# Patient Record
Sex: MALE | Race: BLACK OR AFRICAN AMERICAN | NOT HISPANIC OR LATINO | URBAN - METROPOLITAN AREA
[De-identification: names, ages, dates, MRNs, and addresses within clinical notes are randomized per-mention and may not be internally consistent; named-entity substitution may affect disease eponyms.]

---

## 2021-06-16 ENCOUNTER — EMERGENCY (EMERGENCY)
Facility: HOSPITAL | Age: 41
LOS: 0 days | Discharge: HOME | End: 2021-06-17
Attending: EMERGENCY MEDICINE | Admitting: EMERGENCY MEDICINE
Payer: COMMERCIAL

## 2021-06-16 VITALS
HEIGHT: 64 IN | SYSTOLIC BLOOD PRESSURE: 156 MMHG | TEMPERATURE: 100 F | OXYGEN SATURATION: 99 % | WEIGHT: 179.9 LBS | HEART RATE: 82 BPM | RESPIRATION RATE: 20 BRPM | DIASTOLIC BLOOD PRESSURE: 89 MMHG

## 2021-06-16 DIAGNOSIS — R30.0 DYSURIA: ICD-10-CM

## 2021-06-16 DIAGNOSIS — N13.2 HYDRONEPHROSIS WITH RENAL AND URETERAL CALCULOUS OBSTRUCTION: ICD-10-CM

## 2021-06-16 DIAGNOSIS — R11.0 NAUSEA: ICD-10-CM

## 2021-06-16 DIAGNOSIS — R10.30 LOWER ABDOMINAL PAIN, UNSPECIFIED: ICD-10-CM

## 2021-06-16 PROCEDURE — 99285 EMERGENCY DEPT VISIT HI MDM: CPT

## 2021-06-16 NOTE — ED ADULT TRIAGE NOTE - CHIEF COMPLAINT QUOTE
It started around 5 o'clock I got pain here (right upper and lower quadrant), the pain was in the back, now its going down and around, I feel nauseous and I have the cold sweats - patient

## 2021-06-17 VITALS
DIASTOLIC BLOOD PRESSURE: 81 MMHG | RESPIRATION RATE: 20 BRPM | TEMPERATURE: 99 F | HEART RATE: 81 BPM | OXYGEN SATURATION: 97 % | SYSTOLIC BLOOD PRESSURE: 149 MMHG

## 2021-06-17 LAB
ALBUMIN SERPL ELPH-MCNC: 4.9 G/DL — SIGNIFICANT CHANGE UP (ref 3.5–5.2)
ALP SERPL-CCNC: 77 U/L — SIGNIFICANT CHANGE UP (ref 30–115)
ALT FLD-CCNC: 45 U/L — HIGH (ref 0–41)
ANION GAP SERPL CALC-SCNC: 12 MMOL/L — SIGNIFICANT CHANGE UP (ref 7–14)
APPEARANCE UR: CLEAR — SIGNIFICANT CHANGE UP
AST SERPL-CCNC: 40 U/L — SIGNIFICANT CHANGE UP (ref 0–41)
BACTERIA # UR AUTO: NEGATIVE — SIGNIFICANT CHANGE UP
BASOPHILS # BLD AUTO: 0.01 K/UL — SIGNIFICANT CHANGE UP (ref 0–0.2)
BASOPHILS NFR BLD AUTO: 0.1 % — SIGNIFICANT CHANGE UP (ref 0–1)
BILIRUB DIRECT SERPL-MCNC: <0.2 MG/DL — SIGNIFICANT CHANGE UP (ref 0–0.2)
BILIRUB INDIRECT FLD-MCNC: >0.1 MG/DL — LOW (ref 0.2–1.2)
BILIRUB SERPL-MCNC: 0.3 MG/DL — SIGNIFICANT CHANGE UP (ref 0.2–1.2)
BILIRUB UR-MCNC: NEGATIVE — SIGNIFICANT CHANGE UP
BUN SERPL-MCNC: 21 MG/DL — HIGH (ref 10–20)
CALCIUM SERPL-MCNC: 9.8 MG/DL — SIGNIFICANT CHANGE UP (ref 8.5–10.1)
CHLORIDE SERPL-SCNC: 103 MMOL/L — SIGNIFICANT CHANGE UP (ref 98–110)
CO2 SERPL-SCNC: 23 MMOL/L — SIGNIFICANT CHANGE UP (ref 17–32)
COLOR SPEC: SIGNIFICANT CHANGE UP
CREAT SERPL-MCNC: 1.5 MG/DL — SIGNIFICANT CHANGE UP (ref 0.7–1.5)
DIFF PNL FLD: ABNORMAL
EOSINOPHIL # BLD AUTO: 0 K/UL — SIGNIFICANT CHANGE UP (ref 0–0.7)
EOSINOPHIL NFR BLD AUTO: 0 % — SIGNIFICANT CHANGE UP (ref 0–8)
EPI CELLS # UR: 0 /HPF — SIGNIFICANT CHANGE UP (ref 0–5)
GLUCOSE SERPL-MCNC: 136 MG/DL — HIGH (ref 70–99)
GLUCOSE UR QL: NEGATIVE — SIGNIFICANT CHANGE UP
HCT VFR BLD CALC: 42.8 % — SIGNIFICANT CHANGE UP (ref 42–52)
HGB BLD-MCNC: 14 G/DL — SIGNIFICANT CHANGE UP (ref 14–18)
HYALINE CASTS # UR AUTO: 1 /LPF — SIGNIFICANT CHANGE UP (ref 0–7)
IMM GRANULOCYTES NFR BLD AUTO: 0.3 % — SIGNIFICANT CHANGE UP (ref 0.1–0.3)
KETONES UR-MCNC: ABNORMAL
LACTATE SERPL-SCNC: 0.9 MMOL/L — SIGNIFICANT CHANGE UP (ref 0.7–2)
LEUKOCYTE ESTERASE UR-ACNC: NEGATIVE — SIGNIFICANT CHANGE UP
LIDOCAIN IGE QN: 13 U/L — SIGNIFICANT CHANGE UP (ref 7–60)
LYMPHOCYTES # BLD AUTO: 0.71 K/UL — LOW (ref 1.2–3.4)
LYMPHOCYTES # BLD AUTO: 7.6 % — LOW (ref 20.5–51.1)
MCHC RBC-ENTMCNC: 26.9 PG — LOW (ref 27–31)
MCHC RBC-ENTMCNC: 32.7 G/DL — SIGNIFICANT CHANGE UP (ref 32–37)
MCV RBC AUTO: 82.1 FL — SIGNIFICANT CHANGE UP (ref 80–94)
MONOCYTES # BLD AUTO: 0.51 K/UL — SIGNIFICANT CHANGE UP (ref 0.1–0.6)
MONOCYTES NFR BLD AUTO: 5.5 % — SIGNIFICANT CHANGE UP (ref 1.7–9.3)
NEUTROPHILS # BLD AUTO: 8.06 K/UL — HIGH (ref 1.4–6.5)
NEUTROPHILS NFR BLD AUTO: 86.5 % — HIGH (ref 42.2–75.2)
NITRITE UR-MCNC: NEGATIVE — SIGNIFICANT CHANGE UP
NRBC # BLD: 0 /100 WBCS — SIGNIFICANT CHANGE UP (ref 0–0)
PH UR: 6 — SIGNIFICANT CHANGE UP (ref 5–8)
PLATELET # BLD AUTO: 208 K/UL — SIGNIFICANT CHANGE UP (ref 130–400)
POTASSIUM SERPL-MCNC: 5.1 MMOL/L — HIGH (ref 3.5–5)
POTASSIUM SERPL-SCNC: 5.1 MMOL/L — HIGH (ref 3.5–5)
PROT SERPL-MCNC: 7.7 G/DL — SIGNIFICANT CHANGE UP (ref 6–8)
PROT UR-MCNC: ABNORMAL
RBC # BLD: 5.21 M/UL — SIGNIFICANT CHANGE UP (ref 4.7–6.1)
RBC # FLD: 13.5 % — SIGNIFICANT CHANGE UP (ref 11.5–14.5)
RBC CASTS # UR COMP ASSIST: 1 /HPF — SIGNIFICANT CHANGE UP (ref 0–4)
SODIUM SERPL-SCNC: 138 MMOL/L — SIGNIFICANT CHANGE UP (ref 135–146)
SP GR SPEC: 1.03 — SIGNIFICANT CHANGE UP (ref 1.01–1.03)
UROBILINOGEN FLD QL: SIGNIFICANT CHANGE UP
WBC # BLD: 9.32 K/UL — SIGNIFICANT CHANGE UP (ref 4.8–10.8)
WBC # FLD AUTO: 9.32 K/UL — SIGNIFICANT CHANGE UP (ref 4.8–10.8)
WBC UR QL: 1 /HPF — SIGNIFICANT CHANGE UP (ref 0–5)

## 2021-06-17 PROCEDURE — 74177 CT ABD & PELVIS W/CONTRAST: CPT | Mod: 26,MA

## 2021-06-17 PROCEDURE — 99282 EMERGENCY DEPT VISIT SF MDM: CPT

## 2021-06-17 PROCEDURE — 76705 ECHO EXAM OF ABDOMEN: CPT | Mod: 26

## 2021-06-17 RX ORDER — KETOROLAC TROMETHAMINE 30 MG/ML
30 SYRINGE (ML) INJECTION ONCE
Refills: 0 | Status: DISCONTINUED | OUTPATIENT
Start: 2021-06-17 | End: 2021-06-17

## 2021-06-17 RX ORDER — TAMSULOSIN HYDROCHLORIDE 0.4 MG/1
1 CAPSULE ORAL
Qty: 5 | Refills: 0
Start: 2021-06-17 | End: 2021-06-21

## 2021-06-17 RX ORDER — ONDANSETRON 8 MG/1
4 TABLET, FILM COATED ORAL ONCE
Refills: 0 | Status: COMPLETED | OUTPATIENT
Start: 2021-06-17 | End: 2021-06-17

## 2021-06-17 RX ORDER — CEFPODOXIME PROXETIL 100 MG
1 TABLET ORAL
Qty: 20 | Refills: 0
Start: 2021-06-17 | End: 2021-06-26

## 2021-06-17 RX ORDER — SODIUM CHLORIDE 9 MG/ML
1000 INJECTION, SOLUTION INTRAVENOUS ONCE
Refills: 0 | Status: COMPLETED | OUTPATIENT
Start: 2021-06-17 | End: 2021-06-17

## 2021-06-17 RX ORDER — KETOROLAC TROMETHAMINE 30 MG/ML
1 SYRINGE (ML) INJECTION
Qty: 20 | Refills: 0
Start: 2021-06-17 | End: 2021-06-21

## 2021-06-17 RX ORDER — MORPHINE SULFATE 50 MG/1
4 CAPSULE, EXTENDED RELEASE ORAL ONCE
Refills: 0 | Status: DISCONTINUED | OUTPATIENT
Start: 2021-06-17 | End: 2021-06-17

## 2021-06-17 RX ORDER — CEFTRIAXONE 500 MG/1
1000 INJECTION, POWDER, FOR SOLUTION INTRAMUSCULAR; INTRAVENOUS ONCE
Refills: 0 | Status: COMPLETED | OUTPATIENT
Start: 2021-06-17 | End: 2021-06-17

## 2021-06-17 RX ADMIN — CEFTRIAXONE 100 MILLIGRAM(S): 500 INJECTION, POWDER, FOR SOLUTION INTRAMUSCULAR; INTRAVENOUS at 04:14

## 2021-06-17 RX ADMIN — Medication 30 MILLIGRAM(S): at 04:13

## 2021-06-17 RX ADMIN — ONDANSETRON 4 MILLIGRAM(S): 8 TABLET, FILM COATED ORAL at 02:08

## 2021-06-17 RX ADMIN — SODIUM CHLORIDE 1000 MILLILITER(S): 9 INJECTION, SOLUTION INTRAVENOUS at 03:05

## 2021-06-17 RX ADMIN — MORPHINE SULFATE 4 MILLIGRAM(S): 50 CAPSULE, EXTENDED RELEASE ORAL at 03:08

## 2021-06-17 RX ADMIN — MORPHINE SULFATE 4 MILLIGRAM(S): 50 CAPSULE, EXTENDED RELEASE ORAL at 02:02

## 2021-06-17 RX ADMIN — Medication 30 MILLIGRAM(S): at 04:14

## 2021-06-17 RX ADMIN — SODIUM CHLORIDE 1000 MILLILITER(S): 9 INJECTION, SOLUTION INTRAVENOUS at 04:14

## 2021-06-17 NOTE — ED PROVIDER NOTE - PHYSICAL EXAMINATION
Physical Exam    Vital Signs: I have reviewed the initial vital signs.  Constitutional: well-nourished, appears stated age, no acute distress  Eyes: Conjunctiva pink, Sclera clear   Cardiovascular: S1 and S2, regular rate, regular rhythm, well-perfused extremities, radial pulses equal and 2+ b/l.   Respiratory: unlabored respiratory effort, clear to auscultation bilaterally no wheezing, rales and rhonchi. pt is speaking full sentences. no accessory muscle use.   Gastrointestinal: soft, (+) epigastric and ruq tenderness, nondistended abdomen, no pulsatile mass, normal bowl sounds, no rebound, no guarding, negative psoas, negative obturator, negative murphys. no cva tenderness.  Musculoskeletal: supple neck, no lower extremity edema, no calf tenderness, no midline tenderness, no palpable spinal step offs  Integumentary: warm, dry, no rash  Neurologic: awake, alert  Psychiatric: appropriate mood, appropriate affect

## 2021-06-17 NOTE — ED PROVIDER NOTE - CARE PROVIDER_API CALL
Rajendra Escobar)  Urology  [address] [place] [address]  Phone: [phone]  Fax: [phone]  Follow Up Time: 1-3 Days

## 2021-06-17 NOTE — ED PROVIDER NOTE - OBJECTIVE STATEMENT
39 y/o male with no significant PMH presents to the ED for evaluation of abdominal pain that begin in his lower abdomen and now has localized to the upper abdomen that began around 5pm after work associated with nausea. pt reports mild burning wit urination earlier today that has subsided. pt denies fever, chills, cough, hx of hernia, hx of abdominal surgeries, hx of endoscopy or colonoscopy, vomiting, diarrhea, constipation, blood in the urine or stool, rashes, recent trauma, weakness, chest pain, sob, back pain or hx of kidney stones,

## 2021-06-17 NOTE — CONSULT NOTE ADULT - SUBJECTIVE AND OBJECTIVE BOX
Urology Consult Note: 39 y/o male with no pmh presents to the ED c/o right sided flank pain for 1 day. Patient states that his pain began yesterday around 5pm and was localized to the right upper and and right lower quadrants. Patient states that as the evening progressed, the pain radiated to the back and down to the lower abdomen and was associated with nausea without vomiting. CT A/P revealed mild right hydroureteronephrosis extending to a 4 mm obstructing ureteral stone at the level of the ureterovesicular junction resulting delayed right nephrogram and significant periureteral/perinephric fat stranding. Indeterminant 8 mm left interpolar hypodense lesion. Consider nonemergent outpatient renal ultrasound for further evaluation.  was consulted for further evaluation of hydronephrosis and kidney stone. Patient states that he has never had a kidney stone in the past and has never seen a . Patient states that upon admission to the ED his pain was a 10/10. In ED, patient received Toradol, Morphine, Fluids and abx - patient notes pain is controlled at a 3/10 and feels much better at this time. Patient admits to mild RUQ pain, R flank pain that radiates to the lower abdomen and nausea that has since resolved. Patient denies fevers, chills, vomiting, chest pain, SOB, dysuria, hematuria, and other LUTS.      [ x ] A 10 Point Review of Systems was negative except where noted    Allergies: No Known Allergies    SOCIAL HISTORY: No illicit drug use    Vital Signs Last 24 Hrs  T(C): 37.3 (2021 05:15), Max: 37.7 (2021 23:28)  T(F): 99.2 (2021 05:15), Max: 99.8 (2021 23:28)  HR: 81 (2021 05:15) (81 - 82)  BP: 149/81 (2021 05:15) (149/81 - 156/89)  RR: 20 (2021 05:15) (20 - 20)  SpO2: 97% (2021 05:15) (97% - 99%)    PHYSICAL EXAM:  Constitutional: NAD, well-developed, well nourished. comfortably laying in bed  HEENT: NC/AT  Back: No left CVA tenderness, + mild right CVA tenderness  Respiratory: No accessory respiratory muscle use  Abd: Soft, distended, mild ttp to RUQ, epigastric area  Cardio: +S1/S2  : no suprapubic tenderness to palpation  Extremities: No edema or cyanosis, SIMPSON x 4  Neurological: Awake and alert  Psychiatric: Normal mood, normal affect  Skin: Good color, non diaphoretic    LABS:                      14.0   9.32  )-----------( 208      ( 2021 01:54 )             42.8         138  |  103  |  21<H>  ----------------------------<  136<H>  5.1<H>   |  23  |  1.5    Ca    9.8      2021 01:54    TPro  7.7  /  Alb  4.9  /  TBili  0.3  /  DBili  <0.2  /  AST  40  /  ALT  45<H>  /  AlkPhos  77        Urinalysis Basic - ( 2021 01:56 )    Color: Light Yellow / Appearance: Clear / S.026 / pH: x  Gluc: x / Ketone: Small  / Bili: Negative / Urobili: <2 mg/dL   Blood: x / Protein: 30 mg/dL / Nitrite: Negative   Leuk Esterase: Negative / RBC: 1 /HPF / WBC 1 /HPF   Sq Epi: x / Non Sq Epi: 0 /HPF / Bacteria: Negative    RADIOLOGY & ADDITIONAL STUDIES:  < from: US Abdomen Upper Quadrant Right (21 @ 02:24) >    EXAM:  US ABDOMEN RT UPR QUADRANT            PROCEDURE DATE:  2021            INTERPRETATION:  CLINICAL INFORMATION: Right upper quadrant/epigastric pain.    COMPARISON: None available.    TECHNIQUE: Sonography of the right upper quadrant.    FINDINGS:  Liver: Increased echogenicity.  Bile ducts: Normal caliber. Common bile duct measures 3 mm.  Gallbladder: No cholelithiasis visualized. Indeterminate sonographic Lang's sign.  Pancreas: Visualized portions are within normal limits.  Right kidney: 11.3 cm. No hydronephrosis.  Ascites: None.  IVC: Visualized portions are within normal limits.    IMPRESSION:  1.  No definite sonographic evidence of acute cholecystitis.  2.  Hepatic steatosis.            ERNIE MONTANA M.D., RESIDENT RADIOLOGIST  This document has been electronically signed.  ROWAN BURNS MD; Attending Radiologist  This document has been electronically signed. 2021  3:23AM    < end of copied text >  < from: CT Abdomen and Pelvis w/ IV Cont (21 @ 02:50) >    EXAM:  CT ABDOMEN AND PELVIS IC            PROCEDURE DATE:  2021            INTERPRETATION:  CLINICAL STATEMENT: Right upper quadrant pain.    TECHNIQUE: Contiguous axial CT images were obtained from the lower chest to the pubic symphysis following administration of 100cc Optiray 320 intravenous contrast.  Oral contrast was not administered.  Reformatted images in the coronal and sagittal planes were acquired.    COMPARISON CT: None.    OTHER STUDIES USED FOR CORRELATION: None.      FINDINGS:    LOWER CHEST: Bilateral subsegmental atelectasis.    HEPATOBILIARY: Unremarkable.    SPLEEN: Unremarkable.    PANCREAS: Unremarkable.    ADRENAL GLANDS: Unremarkable.    KIDNEYS: Mild right hydroureteronephrosis extending to a 3 x 4 x 2 mm obstructing ureteral stone at the level of the ureterovesicular junction (HU= [id number]; series 5, image 408). Resulting delayed right-sided nephrogram and periureteral/perinephric fat stranding. Indeterminate 8 mm left interpolar hypodensity, possibly reflecting hemorrhagic cyst.    ABDOMINOPELVIC NODES: Unremarkable.    PELVIC ORGANS: Unremarkable.    PERITONEUM/MESENTERY/BOWEL: No evidence of bowel obstruction, pneumoperitoneum or ascites. Appendix is unremarkable.    BONES/SOFT TISSUES: No acute osseousabnormality.      IMPRESSION:  1.  Mild right hydroureteronephrosis extending to a 4 mm obstructing ureteral stone at the level of the ureterovesicular junction. Resulting delayed right nephrogram and significant periureteral/perinephric fat stranding.  2.  Indeterminant 8 mm left interpolar hypodense lesion. Consider nonemergent outpatient renal ultrasound for further evaluation.            ERNIE MONTANA M.D., RESIDENT RADIOLOGIST  This document has been electronically signed.  ROWAN BURNS MD;Attending Radiologist  This document has been electronically signed. 2021  3:41AM    < end of copied text >

## 2021-06-17 NOTE — ED PROVIDER NOTE - PROGRESS NOTE DETAILS
Pt feels much better. Cleared by urology for DC. Will DC w cefpodoxime and urology f/u. Full DC instructions discussed and patient knows when to seek immediate medical attention. Patient has proper follow-up. All results discussed with the patient they may require further work-up. Limitations of ED work-up discussed. All  questions and concerns from patient or family addressed. Understanding of insturctions verbalized

## 2021-06-17 NOTE — ED ADULT NURSE NOTE - OBJECTIVE STATEMENT
Pt presents to ED c/o R flank pain and N/V. Pt denies fever or dysuria. Pt is awake alert and not in any distress

## 2021-06-17 NOTE — ED PROVIDER NOTE - PATIENT PORTAL LINK FT
You can access the FollowMyHealth Patient Portal offered by [facility] by registering at the following website: [email or web address]. By joining [facility]’s FollowMyHealth portal, you will also be able to view your health information using other applications (apps) compatible with our system.

## 2021-06-17 NOTE — CONSULT NOTE ADULT - ASSESSMENT
39 y/o male c/o right sided flank pain for 1 day - CT A/P revealed mild right hydroureteronephrosis extending to a 4 mm obstructing ureteral stone at the level of the ureterovesicular junction resulting delayed right nephrogram and significant periureteral/perinephric fat stranding. Indeterminant 8 mm left interpolar hypodense lesion. Consider nonemergent outpatient renal ultrasound for further evaluation. S/p Toradol, Morphine, Fluids and abx - patient notes pain is controlled at a 3/10 and feels much better at this time.     Assessment:  -Right mild hydronephrosis 2/2 Right UVJ stone    Plan:  - Cont with pain control  - Please start patient on Flomax if not contraindicated  - Cont with abx  - Cont with antiemetics prn  - Encourage increased po water intake  - Encourage increased ambulation  - Please provide patient with a strainer to strain all urine  - D/c home and f/u with Dr. Escobar as o/p this week, call the office tomorrow morning  - Return to ED if fever >101.3, intractable pain/vomiting  - Pt understands and agrees with plan  - Case d/w ED team

## 2021-06-17 NOTE — ED PROVIDER NOTE - ATTENDING CONTRIBUTION TO CARE
I personally evaluated the patient. I reviewed the Resident’s or Physician Assistant’s note (as assigned above), and agree with the findings and plan except as documented in my note.    41 y/o M w no PMH presents w r sided flank pain for 1 day. No testicular pain. R sided flank pain.    CONSTITUTIONAL: Well-developed; well-nourished; in no acute distress. Sitting up and providing appropriate history and physical examination  SKIN: skin exam is warm and dry, no acute rash.  HEAD: Normocephalic; atraumatic.  EYES: PERRL, 3 mm bilateral, no nystagmus, EOM intact; conjunctiva and sclera clear.  ENT: No nasal discharge; airway clear.  NECK: Supple; non tender.+ full passive ROM in all directions. No JVD  CARD: S1, S2 normal; no murmurs, gallops, or rubs. Regular rate and rhythm. + Symmetric Strong Pulses  RESP: No wheezes, rales or rhonchi. Good air movement bilaterally  ABD: soft; no guarding    Plan- labs, UA, CT, reassess

## 2024-06-20 ENCOUNTER — APPOINTMENT (OUTPATIENT)
Dept: OPHTHALMOLOGY | Facility: CLINIC | Age: 44
End: 2024-06-20
Payer: MEDICAID

## 2024-06-20 ENCOUNTER — OUTPATIENT (OUTPATIENT)
Dept: OUTPATIENT SERVICES | Facility: HOSPITAL | Age: 44
LOS: 1 days | End: 2024-06-20
Payer: MEDICAID

## 2024-06-20 DIAGNOSIS — H16.229 KERATOCONJUNCTIVITIS SICCA, NOT SPECIFIED AS SJOGREN'S, UNSPECIFIED EYE: ICD-10-CM

## 2024-06-20 DIAGNOSIS — H33.321 ROUND HOLE, RIGHT EYE: ICD-10-CM

## 2024-06-20 DIAGNOSIS — Z98.890 OTHER SPECIFIED POSTPROCEDURAL STATES: ICD-10-CM

## 2024-06-20 DIAGNOSIS — H17.9 UNSPECIFIED CORNEAL SCAR AND OPACITY: ICD-10-CM

## 2024-06-20 DIAGNOSIS — H53.8 OTHER VISUAL DISTURBANCES: ICD-10-CM

## 2024-06-20 DIAGNOSIS — H02.88A MEIBOMIAN GLAND DYSFUNCTION RIGHT EYE, UPPER AND LOWER EYELIDS: ICD-10-CM

## 2024-06-20 DIAGNOSIS — H35.30 UNSPECIFIED MACULAR DEGENERATION: ICD-10-CM

## 2024-06-20 PROBLEM — Z00.00 ENCOUNTER FOR PREVENTIVE HEALTH EXAMINATION: Status: ACTIVE | Noted: 2024-06-20

## 2024-06-20 PROCEDURE — 92004 COMPRE OPH EXAM NEW PT 1/>: CPT

## 2024-12-17 ENCOUNTER — OUTPATIENT (OUTPATIENT)
Dept: OUTPATIENT SERVICES | Facility: HOSPITAL | Age: 44
LOS: 1 days | End: 2024-12-17
Payer: MEDICAID

## 2024-12-17 ENCOUNTER — APPOINTMENT (OUTPATIENT)
Dept: OPHTHALMOLOGY | Facility: CLINIC | Age: 44
End: 2024-12-17
Payer: MEDICAID

## 2024-12-17 DIAGNOSIS — H35.30 UNSPECIFIED MACULAR DEGENERATION: ICD-10-CM

## 2024-12-17 DIAGNOSIS — H33.321 ROUND HOLE, RIGHT EYE: ICD-10-CM

## 2024-12-17 DIAGNOSIS — H16.229 KERATOCONJUNCTIVITIS SICCA, NOT SPECIFIED AS SJOGREN'S, UNSPECIFIED EYE: ICD-10-CM

## 2024-12-17 DIAGNOSIS — H02.88A MEIBOMIAN GLAND DYSFUNCTION RIGHT EYE, UPPER AND LOWER EYELIDS: ICD-10-CM

## 2024-12-17 DIAGNOSIS — H53.8 OTHER VISUAL DISTURBANCES: ICD-10-CM

## 2024-12-17 DIAGNOSIS — H17.9 UNSPECIFIED CORNEAL SCAR AND OPACITY: ICD-10-CM

## 2024-12-17 PROCEDURE — 99204 OFFICE O/P NEW MOD 45 MIN: CPT

## 2024-12-17 PROCEDURE — 99214 OFFICE O/P EST MOD 30 MIN: CPT | Mod: 95

## 2025-05-01 ENCOUNTER — APPOINTMENT (OUTPATIENT)
Dept: OPHTHALMOLOGY | Facility: CLINIC | Age: 45
End: 2025-05-01

## 2025-06-17 ENCOUNTER — APPOINTMENT (OUTPATIENT)
Dept: OPHTHALMOLOGY | Facility: CLINIC | Age: 45
End: 2025-06-17